# Patient Record
Sex: MALE | Race: BLACK OR AFRICAN AMERICAN | Employment: OTHER | URBAN - METROPOLITAN AREA
[De-identification: names, ages, dates, MRNs, and addresses within clinical notes are randomized per-mention and may not be internally consistent; named-entity substitution may affect disease eponyms.]

---

## 2018-11-06 ENCOUNTER — HOSPITAL ENCOUNTER (OUTPATIENT)
Dept: WOUND CARE | Age: 53
Discharge: HOME | End: 2018-11-06
Payer: OTHER GOVERNMENT

## 2018-11-06 VITALS
RESPIRATION RATE: 16 BRPM | SYSTOLIC BLOOD PRESSURE: 135 MMHG | BODY MASS INDEX: 29.8 KG/M2 | DIASTOLIC BLOOD PRESSURE: 86 MMHG | OXYGEN SATURATION: 100 % | TEMPERATURE: 98.8 F | HEART RATE: 67 BPM | HEIGHT: 72 IN | WEIGHT: 220 LBS

## 2018-11-06 PROCEDURE — 99201 HC NEW PT LEVEL I: CPT

## 2018-11-06 RX ORDER — MELATONIN
DAILY
COMMUNITY

## 2018-11-06 RX ORDER — HYDROXYCHLOROQUINE SULFATE 200 MG/1
200 TABLET, FILM COATED ORAL DAILY
COMMUNITY

## 2018-11-06 RX ORDER — TRAZODONE HYDROCHLORIDE 100 MG/1
100 TABLET ORAL
COMMUNITY

## 2018-11-06 RX ORDER — BUPROPION HYDROCHLORIDE 150 MG/1
150 TABLET, EXTENDED RELEASE ORAL 2 TIMES DAILY
COMMUNITY

## 2018-11-06 RX ORDER — AMLODIPINE AND BENAZEPRIL HYDROCHLORIDE 5; 20 MG/1; MG/1
1 CAPSULE ORAL DAILY
COMMUNITY

## 2018-11-06 RX ORDER — ATORVASTATIN CALCIUM 40 MG/1
40 TABLET, FILM COATED ORAL DAILY
COMMUNITY

## 2018-11-06 NOTE — WOUND CARE
11/06/18 1031 Wound Hand Plantar Date First Assessed/Time First Assessed: 11/06/18 1000   Wound Type: Trauma  Location: Hand  Orientation: Plantar DRESSING STATUS Clean, dry, and intact DRESSING TYPE (mary carmen, gauze, tape) Non-Pressure Injury Partial thickness (epider/derm) Wound Length (cm) 1.8 cm Wound Width (cm) 5.8 cm Wound Depth (cm) 0.1 Wound Surface area (cm^2) 10.44 cm^2 Condition of Edges Closed Tissue Type Percent Pink 80 Tissue Type Percent Red 10 Tissue Type Percent Yellow 10 Drainage Amount  Scant Drainage Color Serous Periwound Skin Condition Intact Cleansing and Cleansing Agents  (foam wash, vashe and barrier wipes) Dressing Type Applied (bacitracin, aquacel ag, mary carmen, tubigrip) Procedure Tolerated Well

## 2018-11-09 ENCOUNTER — HOSPITAL ENCOUNTER (OUTPATIENT)
Dept: WOUND CARE | Age: 53
End: 2018-11-09
Payer: OTHER GOVERNMENT

## 2018-11-13 ENCOUNTER — HOSPITAL ENCOUNTER (OUTPATIENT)
Dept: WOUND CARE | Age: 53
Discharge: HOME | End: 2018-11-13
Payer: OTHER GOVERNMENT

## 2018-11-13 VITALS
SYSTOLIC BLOOD PRESSURE: 130 MMHG | HEART RATE: 73 BPM | RESPIRATION RATE: 18 BRPM | DIASTOLIC BLOOD PRESSURE: 85 MMHG | TEMPERATURE: 98.2 F | OXYGEN SATURATION: 99 %

## 2018-11-13 PROBLEM — S61.401A OPEN WOUND OF RIGHT HAND: Status: ACTIVE | Noted: 2018-11-13

## 2018-11-13 PROCEDURE — 97602 WOUND(S) CARE NON-SELECTIVE: CPT

## 2018-11-13 PROCEDURE — 99214 OFFICE O/P EST MOD 30 MIN: CPT

## 2018-11-13 NOTE — WOUND CARE
11/13/18 1355 Wound Hand Plantar Date First Assessed/Time First Assessed: 11/06/18 1000   Wound Type: Trauma  Location: Hand  Orientation: Plantar DRESSING STATUS Removed Non-Pressure Injury Partial thickness (epider/derm) Wound Length (cm) 0.5 cm Wound Width (cm) 0.5 cm Wound Depth (cm) 1 Wound Surface area (cm^2) 0.25 cm^2 Change in Wound Size % 97.61 Condition of Edges Closed Tissue Type Percent Red 100 Drainage Amount  Scant Drainage Color Sanguinous Wound Odor None Periwound Skin Condition Other (comment) (newly healed, dry) Cleansing and Cleansing Agents  Other (comment) (vashe) Dressing Type Applied Other (Comment) (bandaid, ABO) Procedure Tolerated Well

## 2018-11-13 NOTE — WOUND CARE
[facility] Center[facility] Chief Complaint: 
Alla Doss is a 46 y.o.  male who presents with right had wound present since October 2018 HPI:   He was working with blade and he accidentally cut his right hand. He has history of CVA and he takes coumadin Wound caused by: acute injury due to blade Current wound care: local wound care Offloading wound: yes and n/a Appetite: good Wound associated pain: none Diabetic: NO Smoker: No 
 
 
Past Medical History:  
Diagnosis Date  CVA (cerebral vascular accident) ([address]  HTN (hypertension) No past surgical history on file. No family history on file. Social History Tobacco Use  Smoking status: Not on file Substance Use Topics  Alcohol use: Not on file Prior to Admission medications Medication Sig Start Date End Date Taking? Authorizing Provider  
amLODIPine-benazepril (LOTREL) 5-20 mg per capsule Take 1 Cap by mouth daily. Provider, Historical  
atorvastatin (LIPITOR) 40 mg tablet Take 40 mg by mouth daily. Provider, Historical  
buPROPion SR (WELLBUTRIN SR) 150 mg SR tablet Take 150 mg by mouth two (2) times a day. Provider, Historical  
cholecalciferol (VITAMIN D3) 1,000 unit tablet Take  by mouth daily. Provider, Historical  
hydroxychloroquine (PLAQUENIL) 200 mg tablet Take 200 mg by mouth daily. Provider, Historical  
traZODone (DESYREL) 100 mg tablet Take 100 mg by mouth nightly. Provider, Historical  
 
No Known Allergies Review of Systems Gen: No fever, chills, malaise, weight loss/gain. Heent: No headache, rhinorrhea, epistaxis, ear pain, hearing loss, sinus pain, neck pain/stiffness, sore throat. Heart: No chest pain, palpitations, LEUNG, pnd, or orthopnea. Resp: No cough, hemoptysis, wheezing and shortness of breath. GI: No nausea, vomiting, diarrhea, constipation, melena or hematochezia. : No urinary obstruction, dysuria or hematuria. Derm: see above Musc/skeletal: no bone or joint complains. Vasc: No edema, cyanosis or claudication. Endo: No heat/cold intolerance, no polyuria,polydipsia or polyphagia. Neuro: No unilateral weakness, numbness, tingling. No seizures. Heme: No easy bruising or bleeding. Objective:  
 
Physical Exam:  
 
Visit Vitals /85 (BP 1 Location: Left arm, BP Patient Position: Sitting) Pulse 73 Temp 98.2 °F (36.8 °C) Resp 18 SpO2 99% General: well developed, well nourished, pleasant , NAD. Hygiene good Psych: cooperative. Pleasant. No anxiety or depression. Normal mood and affect. Neuro: alert and oriented to person/place/situation. Otherwise nonfocal. 
Derm: Skin turgor for age, dry skin HEENT: Normocephalic, atraumatic. EOMI. Conjunctiva clear. No scleral icterus. Neck: Normal range of motion. No masses. Chest: Good air entry bilaterally. Respirations nonlabored Cardio[de-identified] Normal heart sounds,no rubs, murmurs or gallops Abdomen: Soft, nontender, nondistended, normoactive bowel sounds Lower extremities: color normal; temperature normal. Calves are supple, nontender. Capillary refill <3 sec Left had wound Wound Description: Wound Length: 0.5 cm Wound Width :0.5 cm Wound Depth :1 
 
Etiology: injury Ulcer bed: Granular/Healthy Periwound: Normal 
Exudate: None: wound tissue dry Data Review: No results found for this or any previous visit (from the past 24 hour(s)). Assessment:  
 
Patient Active Problem List  
Diagnosis Code  Open wound of right hand S61.401A 46 y.o. male with right hand wound Needs : 
 
Good local wound care Plan: In wound care clinic today: 
Cleanse wound with NS or soap and water or commercial wound cleanser Apply the following topically to wound bed: topical antibiotic For Home Care/Self Care: 
Cleanse wound with NS or soap and water or commercial wound cleanser Keep dressing dry and intact when bathing Apply the following to wound bed: topical antibiotic. Follow up as needed Signed By: Uriel Reese MD   
 November 13, 2018